# Patient Record
Sex: FEMALE | Race: WHITE | NOT HISPANIC OR LATINO | Employment: PART TIME | URBAN - METROPOLITAN AREA
[De-identification: names, ages, dates, MRNs, and addresses within clinical notes are randomized per-mention and may not be internally consistent; named-entity substitution may affect disease eponyms.]

---

## 2017-02-17 ENCOUNTER — ALLSCRIPTS OFFICE VISIT (OUTPATIENT)
Dept: OTHER | Facility: OTHER | Age: 67
End: 2017-02-17

## 2017-02-25 ENCOUNTER — HOSPITAL ENCOUNTER (EMERGENCY)
Facility: HOSPITAL | Age: 67
Discharge: HOME | End: 2017-02-25
Attending: EMERGENCY MEDICINE
Payer: COMMERCIAL

## 2017-02-25 ENCOUNTER — APPOINTMENT (EMERGENCY)
Dept: CT IMAGING | Facility: HOSPITAL | Age: 67
End: 2017-02-25
Payer: COMMERCIAL

## 2017-02-25 VITALS
HEART RATE: 84 BPM | OXYGEN SATURATION: 94 % | SYSTOLIC BLOOD PRESSURE: 164 MMHG | HEIGHT: 63 IN | RESPIRATION RATE: 20 BRPM | BODY MASS INDEX: 31.71 KG/M2 | WEIGHT: 179 LBS | DIASTOLIC BLOOD PRESSURE: 70 MMHG | TEMPERATURE: 97.2 F

## 2017-02-25 DIAGNOSIS — R31.9 HEMATURIA: Primary | ICD-10-CM

## 2017-02-25 LAB
BACTERIA UR QL AUTO: ABNORMAL /HPF
BILIRUB UR QL STRIP: NEGATIVE
CLARITY UR: CLEAR
COLOR UR: YELLOW
GLUCOSE UR STRIP-MCNC: NEGATIVE MG/DL
HGB UR QL STRIP.AUTO: ABNORMAL
KETONES UR STRIP-MCNC: NEGATIVE MG/DL
LEUKOCYTE ESTERASE UR QL STRIP: NEGATIVE
NITRITE UR QL STRIP: NEGATIVE
NON-SQ EPI CELLS URNS QL MICRO: ABNORMAL /HPF
PH UR STRIP.AUTO: 5.5 [PH] (ref 4.5–8)
PROT UR STRIP-MCNC: NEGATIVE MG/DL
RBC #/AREA URNS AUTO: ABNORMAL /HPF
SP GR UR STRIP.AUTO: 1.02 (ref 1–1.03)
UROBILINOGEN UR QL STRIP.AUTO: 0.2 E.U./DL
WBC #/AREA URNS AUTO: ABNORMAL /HPF

## 2017-02-25 PROCEDURE — 74176 CT ABD & PELVIS W/O CONTRAST: CPT

## 2017-02-25 PROCEDURE — 81001 URINALYSIS AUTO W/SCOPE: CPT | Performed by: EMERGENCY MEDICINE

## 2017-02-25 PROCEDURE — 87086 URINE CULTURE/COLONY COUNT: CPT | Performed by: EMERGENCY MEDICINE

## 2017-02-25 PROCEDURE — 99284 EMERGENCY DEPT VISIT MOD MDM: CPT

## 2017-02-25 RX ORDER — AMOXICILLIN 500 MG/1
500 CAPSULE ORAL 3 TIMES DAILY
Qty: 15 CAPSULE | Refills: 0 | Status: SHIPPED | OUTPATIENT
Start: 2017-02-25 | End: 2017-03-02

## 2017-02-25 RX ORDER — ATORVASTATIN CALCIUM 20 MG/1
TABLET, FILM COATED ORAL
COMMUNITY
Start: 2016-11-09 | End: 2018-05-01 | Stop reason: ALTCHOICE

## 2017-02-25 RX ORDER — ESCITALOPRAM OXALATE 10 MG/1
TABLET ORAL
COMMUNITY
Start: 2016-11-18 | End: 2018-02-14 | Stop reason: SDUPTHER

## 2017-02-26 LAB — BACTERIA UR CULT: NORMAL

## 2017-04-04 ENCOUNTER — ALLSCRIPTS OFFICE VISIT (OUTPATIENT)
Dept: OTHER | Facility: OTHER | Age: 67
End: 2017-04-04

## 2017-04-04 ENCOUNTER — HOSPITAL ENCOUNTER (OUTPATIENT)
Dept: CT IMAGING | Facility: CLINIC | Age: 67
Discharge: HOME | End: 2017-04-04
Payer: COMMERCIAL

## 2017-04-04 ENCOUNTER — TRANSCRIBE ORDERS (OUTPATIENT)
Dept: ADMINISTRATIVE | Facility: HOSPITAL | Age: 67
End: 2017-04-04

## 2017-04-04 DIAGNOSIS — R10.32 ABDOMINAL PAIN, LEFT LOWER QUADRANT: ICD-10-CM

## 2017-04-04 DIAGNOSIS — R31.29 MICROSCOPIC HEMATURIA: ICD-10-CM

## 2017-04-04 DIAGNOSIS — R10.32 LEFT LOWER QUADRANT PAIN: ICD-10-CM

## 2017-04-04 DIAGNOSIS — R31.29 OTHER MICROSCOPIC HEMATURIA: ICD-10-CM

## 2017-04-04 DIAGNOSIS — R10.32 ABDOMINAL PAIN, LEFT LOWER QUADRANT: Primary | ICD-10-CM

## 2017-04-04 PROCEDURE — 74176 CT ABD & PELVIS W/O CONTRAST: CPT

## 2017-04-05 ENCOUNTER — GENERIC CONVERSION - ENCOUNTER (OUTPATIENT)
Dept: OTHER | Facility: OTHER | Age: 67
End: 2017-04-05

## 2017-04-06 ENCOUNTER — APPOINTMENT (OUTPATIENT)
Dept: LAB | Facility: CLINIC | Age: 67
End: 2017-04-06
Payer: COMMERCIAL

## 2017-04-06 DIAGNOSIS — R31.29 OTHER MICROSCOPIC HEMATURIA: ICD-10-CM

## 2017-04-06 DIAGNOSIS — R10.32 LEFT LOWER QUADRANT PAIN: ICD-10-CM

## 2017-04-06 LAB
ALBUMIN SERPL BCP-MCNC: 3.6 G/DL (ref 3.5–5)
ALP SERPL-CCNC: 66 U/L (ref 46–116)
ALT SERPL W P-5'-P-CCNC: 18 U/L (ref 12–78)
ANION GAP SERPL CALCULATED.3IONS-SCNC: 8 MMOL/L (ref 4–13)
AST SERPL W P-5'-P-CCNC: 11 U/L (ref 5–45)
BASOPHILS # BLD AUTO: 0.02 THOUSANDS/ΜL (ref 0–0.1)
BASOPHILS NFR BLD AUTO: 0 % (ref 0–1)
BILIRUB SERPL-MCNC: 0.32 MG/DL (ref 0.2–1)
BILIRUB UR QL STRIP: NEGATIVE
BUN SERPL-MCNC: 13 MG/DL (ref 5–25)
CALCIUM SERPL-MCNC: 9.3 MG/DL (ref 8.3–10.1)
CHLORIDE SERPL-SCNC: 102 MMOL/L (ref 100–108)
CLARITY UR: CLEAR
CO2 SERPL-SCNC: 26 MMOL/L (ref 21–32)
COLOR UR: YELLOW
CREAT SERPL-MCNC: 1.15 MG/DL (ref 0.6–1.3)
EOSINOPHIL # BLD AUTO: 0.14 THOUSAND/ΜL (ref 0–0.61)
EOSINOPHIL NFR BLD AUTO: 2 % (ref 0–6)
ERYTHROCYTE [DISTWIDTH] IN BLOOD BY AUTOMATED COUNT: 13.8 % (ref 11.6–15.1)
GFR SERPL CREATININE-BSD FRML MDRD: 47.5 ML/MIN/1.73SQ M
GLUCOSE SERPL-MCNC: 106 MG/DL (ref 65–140)
GLUCOSE UR STRIP-MCNC: NEGATIVE MG/DL
HCT VFR BLD AUTO: 39.4 % (ref 34.8–46.1)
HGB BLD-MCNC: 13.3 G/DL (ref 11.5–15.4)
HGB UR QL STRIP.AUTO: NEGATIVE
KETONES UR STRIP-MCNC: NEGATIVE MG/DL
LEUKOCYTE ESTERASE UR QL STRIP: NEGATIVE
LYMPHOCYTES # BLD AUTO: 2.38 THOUSANDS/ΜL (ref 0.6–4.47)
LYMPHOCYTES NFR BLD AUTO: 32 % (ref 14–44)
MCH RBC QN AUTO: 31.6 PG (ref 26.8–34.3)
MCHC RBC AUTO-ENTMCNC: 33.8 G/DL (ref 31.4–37.4)
MCV RBC AUTO: 94 FL (ref 82–98)
MONOCYTES # BLD AUTO: 0.56 THOUSAND/ΜL (ref 0.17–1.22)
MONOCYTES NFR BLD AUTO: 7 % (ref 4–12)
NEUTROPHILS # BLD AUTO: 4.44 THOUSANDS/ΜL (ref 1.85–7.62)
NEUTS SEG NFR BLD AUTO: 59 % (ref 43–75)
NITRITE UR QL STRIP: NEGATIVE
NRBC BLD AUTO-RTO: 0 /100 WBCS
PH UR STRIP.AUTO: 6.5 [PH] (ref 4.5–8)
PLATELET # BLD AUTO: 336 THOUSANDS/UL (ref 149–390)
PMV BLD AUTO: 10.2 FL (ref 8.9–12.7)
POTASSIUM SERPL-SCNC: 4.3 MMOL/L (ref 3.5–5.3)
PROT SERPL-MCNC: 7.4 G/DL (ref 6.4–8.2)
PROT UR STRIP-MCNC: NEGATIVE MG/DL
RBC # BLD AUTO: 4.21 MILLION/UL (ref 3.81–5.12)
SODIUM SERPL-SCNC: 136 MMOL/L (ref 136–145)
SP GR UR STRIP.AUTO: 1.01 (ref 1–1.03)
UROBILINOGEN UR QL STRIP.AUTO: 0.2 E.U./DL
WBC # BLD AUTO: 7.55 THOUSAND/UL (ref 4.31–10.16)

## 2017-04-06 PROCEDURE — 81003 URINALYSIS AUTO W/O SCOPE: CPT

## 2017-04-06 PROCEDURE — 80053 COMPREHEN METABOLIC PANEL: CPT

## 2017-04-06 PROCEDURE — 88112 CYTOPATH CELL ENHANCE TECH: CPT

## 2017-04-06 PROCEDURE — 85025 COMPLETE CBC W/AUTO DIFF WBC: CPT

## 2017-04-06 PROCEDURE — 36415 COLL VENOUS BLD VENIPUNCTURE: CPT

## 2017-04-10 ENCOUNTER — GENERIC CONVERSION - ENCOUNTER (OUTPATIENT)
Dept: OTHER | Facility: OTHER | Age: 67
End: 2017-04-10

## 2017-11-19 ENCOUNTER — APPOINTMENT (OUTPATIENT)
Dept: URGENT CARE | Facility: CLINIC | Age: 67
End: 2017-11-19
Payer: COMMERCIAL

## 2017-11-19 ENCOUNTER — TRANSCRIBE ORDERS (OUTPATIENT)
Dept: URGENT CARE | Facility: CLINIC | Age: 67
End: 2017-11-19

## 2017-11-19 DIAGNOSIS — R69 SICKNESS: Primary | ICD-10-CM

## 2017-11-19 PROCEDURE — S9083 URGENT CARE CENTER GLOBAL: HCPCS

## 2017-11-19 PROCEDURE — G0382 LEV 3 HOSP TYPE B ED VISIT: HCPCS

## 2017-11-19 PROCEDURE — 87798 DETECT AGENT NOS DNA AMP: CPT

## 2017-11-20 LAB
FLUAV AG SPEC QL: NORMAL
FLUBV AG SPEC QL: NORMAL
RSV B RNA SPEC QL NAA+PROBE: NORMAL

## 2018-01-11 NOTE — MISCELLANEOUS
Message   Recorded as Task   Date: 11/18/2016 06:36 AM, Created By: Annika Cardenas   Task Name: Go to Result   Assigned To: Zignals   Regarding Patient: Kian Mascorro, Status: Active   Comment:    Latosha Tony - 18 Nov 2016 6:36 AM     TASK CREATED  Please let the patient know the Holter monitor was normal    11/18/2016 Patient notified at office visit  trb      Active Problems    1  Allergic rhinitis (477 9) (J30 9)   2  Cervical myofascial strain, initial encounter (847 0) (S16 1XXA)   3  Chronic cervical pain (723 1,338 29) (M54 2,G89 29)   4  Chronic nausea (787 02) (R11 0)   5  Chronic obstructive pulmonary disease (496) (J44 9)   6  Depression (311) (F32 9)   7  Esophageal reflux (530 81) (K21 9)   8  Exposure To Molds (V87 31)   9  Generalized anxiety disorder (300 02) (F41 1)   10  Hyperlipidemia (272 4) (E78 5)   11  Left facial numbness (782 0) (R20 0)   12  Lightheadedness (780 4) (R42)   13  Near syncope (780 2) (R55)   14  Nicotine dependence (305 1) (F17 200)   15  Panic attack (300 01) (F41 0)    Current Meds   1  Atorvastatin Calcium 20 MG Oral Tablet; TAKE 1 TABLET AT BEDTIME; Therapy: [id number] to (Tae Ribeiro)  Requested for: [id number]; Last   Rx:09Nov2016 Ordered   2  Diclofenac Sodium 1 % Transdermal Gel; 2 GM OF GEL TO AFFECTED AREA 4 TIMES   DAILY as needed  DO NOT APPLY MORE THAN 8 GM DAILY TO ANY ONE AFFECTED   JOINT; Therapy: [id number] to (Chris Lara)  Requested for: 26Oct2016 Ordered   3  Escitalopram Oxalate 10 MG Oral Tablet; TAKE 1 AND 1/2 TABLETS DAILY; Therapy: 69KFA4051 to (Evaluate:[id number]); Last Rx:18Nov2016 Ordered   4  LORazepam 0 5 MG Oral Tablet; TAKE 1 TABLET 3 times daily PRN; Therapy: [id number] to (Evaluate:28Nov2016); Last Rx:18Nov2016 Ordered   5  PriLOSEC OTC 20 MG Oral Tablet Delayed Release; Take 1 tablet daily Recorded    Allergies    1  Ceftin TABS   2  Shellfish-derived Products   3  Bactrim TABS   4  Erythromycin Derivatives   5  LevoFLOXacin TABS   6  Silver Nitrate MISC    Signatures   Electronically signed by :  Carlota Rhodes, ; Nov 18 2016  3:49PM EST                       (Author)

## 2018-01-14 VITALS
TEMPERATURE: 97 F | SYSTOLIC BLOOD PRESSURE: 130 MMHG | DIASTOLIC BLOOD PRESSURE: 70 MMHG | WEIGHT: 180 LBS | BODY MASS INDEX: 31.89 KG/M2 | HEIGHT: 63 IN | RESPIRATION RATE: 15 BRPM | HEART RATE: 80 BPM

## 2018-01-15 VITALS
DIASTOLIC BLOOD PRESSURE: 74 MMHG | HEIGHT: 63 IN | HEART RATE: 78 BPM | BODY MASS INDEX: 31.54 KG/M2 | SYSTOLIC BLOOD PRESSURE: 150 MMHG | TEMPERATURE: 98.2 F | WEIGHT: 178 LBS

## 2018-01-18 NOTE — MISCELLANEOUS
Message   Recorded as Task   Date: 11/09/2016 05:54 PM, Created By: Malinda Cuenca   Task Name: Go to Result   Assigned To: Dello Aryan   Regarding Patient: Lynn Mcintosh, Status: Active   Comment:    Malinda Cuenca - 09 Nov 2016 5:54 PM     TASK CREATED  Please let the patient know the Echo was normal    Fabio Baeza - 10 Nov 2016 8:55 AM     TASK EDITED   Patient was informed by her home number  Active Problems    1  Allergic rhinitis (477 9) (J30 9)   2  Cervical myofascial strain, initial encounter (847 0) (S16 1XXA)   3  Chronic cervical pain (723 1,338 29) (M54 2,G89 29)   4  Chronic nausea (787 02) (R11 0)   5  Chronic obstructive pulmonary disease (496) (J44 9)   6  Depression (311) (F32 9)   7  Esophageal reflux (530 81) (K21 9)   8  Exposure To Molds (V87 31)   9  Hyperlipidemia (272 4) (E78 5)   10  Left facial numbness (782 0) (R20 0)   11  Lightheadedness (780 4) (R42)   12  Near syncope (780 2) (R55)   13  Needs flu shot (V04 81) (Z23)   14  Nicotine dependence (305 1) (F17 200)    Current Meds   1  Atorvastatin Calcium 20 MG Oral Tablet; TAKE 1 TABLET AT BEDTIME; Therapy: [id number] to (Roberto Brenner)  Requested for: [id number]; Last   Rx:09Nov2016 Ordered   2  Diclofenac Sodium 1 % Transdermal Gel; 2 GM OF GEL TO AFFECTED AREA 4 TIMES   DAILY as needed  DO NOT APPLY MORE THAN 8 GM DAILY TO ANY ONE AFFECTED   JOINT; Therapy: [id number] to (Last Dustin Eagle)  Requested for: 26Oct2016 Ordered   3  PriLOSEC OTC 20 MG Oral Tablet Delayed Release; Take 1 tablet daily Recorded    Allergies    1  Ceftin TABS   2  Shellfish-derived Products   3  Bactrim TABS   4  Erythromycin Derivatives   5  LevoFLOXacin TABS   6   Silver Nitrate MISC    Signatures   Electronically signed by : Cleo Schmidt, ; Nov 10 2016  8:55AM EST                       (Author)

## 2018-02-14 DIAGNOSIS — F32.A DEPRESSION, UNSPECIFIED DEPRESSION TYPE: Primary | ICD-10-CM

## 2018-02-14 RX ORDER — ESCITALOPRAM OXALATE 10 MG/1
TABLET ORAL
Qty: 135 TABLET | Refills: 1 | Status: SHIPPED | OUTPATIENT
Start: 2018-02-14 | End: 2018-08-17 | Stop reason: SDUPTHER

## 2018-05-01 ENCOUNTER — OFFICE VISIT (OUTPATIENT)
Dept: FAMILY MEDICINE CLINIC | Facility: CLINIC | Age: 68
End: 2018-05-01
Payer: COMMERCIAL

## 2018-05-01 VITALS
HEIGHT: 63 IN | SYSTOLIC BLOOD PRESSURE: 114 MMHG | TEMPERATURE: 98.7 F | BODY MASS INDEX: 28.7 KG/M2 | DIASTOLIC BLOOD PRESSURE: 60 MMHG | HEART RATE: 72 BPM | WEIGHT: 162 LBS

## 2018-05-01 DIAGNOSIS — A08.4 VIRAL GASTROENTERITIS: Primary | ICD-10-CM

## 2018-05-01 PROCEDURE — 3008F BODY MASS INDEX DOCD: CPT | Performed by: FAMILY MEDICINE

## 2018-05-01 PROCEDURE — 99213 OFFICE O/P EST LOW 20 MIN: CPT | Performed by: FAMILY MEDICINE

## 2018-05-01 RX ORDER — OMEPRAZOLE 20 MG/1
1 TABLET, DELAYED RELEASE ORAL DAILY PRN
COMMUNITY

## 2018-05-01 RX ORDER — ONDANSETRON 4 MG/1
4 TABLET, ORALLY DISINTEGRATING ORAL EVERY 6 HOURS PRN
Qty: 20 TABLET | Refills: 0 | Status: SHIPPED | OUTPATIENT
Start: 2018-05-01 | End: 2018-10-26

## 2018-05-01 NOTE — PROGRESS NOTES
Assessment/Plan:  Viral gastroenteritis-the patient was advised that this is viral and will need to run its course  I have given her the generic Zofran to take as needed for nausea  She will follow a brat diet and will avoid dairy products for the rest of the week  She will take small frequent sips of clear liquids and gradually advance her diet  She can take Tylenol 325 med mg 1-2 tablets every 6 hours as needed for pain or fever  She will follow up in the emergency room with any worsening pain, vomiting, bloody diarrhea, or fever  We will see her back as needed  Diagnoses and all orders for this visit:    Viral gastroenteritis  -     ondansetron (ZOFRAN-ODT) 4 mg disintegrating tablet; Take 1 tablet (4 mg total) by mouth every 6 (six) hours as needed for nausea or vomiting    Other orders  -     omeprazole (PRILOSEC OTC) 20 MG tablet; Take 1 tablet by mouth daily as needed         No Follow-up on file  Subjective:   Chief Complaint   Patient presents with    Diarrhea     x several days    Vomiting        Patient ID: Earnestine Vazquez is a 74 y o  female presents today for vomiting and diarrhea  Earnestine Vazquez is a 74 y o  female who presents today with diarrhea and vomiting for 3 days  The diarrhea is almost constant is all watery  She is unable to keep anything down including water  She is achy and cannot eat  She is still urinating  There are palpitations  There is no chest pain or shortness of breath  There is abdominal cramping  There is back pain  She tried Aleve for the pain but it made her nauseated and she cannot keep it down  There are no fevers  There is a productive cough  There is a headache  She cannot leave the house  She is feeling very sick  Diarrhea    This is a new problem  The current episode started in the past 7 days  The problem occurs more than 10 times per day  The stool consistency is described as watery   Associated symptoms include abdominal pain, arthralgias, chills, coughing, headaches, myalgias, sweats and vomiting  Pertinent negatives include no bloating, fever, increased  flatus, URI or weight loss  The symptoms are aggravated by dairy products  She has tried analgesics for the symptoms  The treatment provided no relief  Vomiting    This is a new problem  The current episode started in the past 7 days  The problem occurs 2 to 4 times per day  The problem has been unchanged  Associated symptoms include abdominal pain, arthralgias, chills, coughing, diarrhea, headaches, myalgias and sweats  Pertinent negatives include no chest pain, dizziness, fever, URI or weight loss  She has tried acetaminophen for the symptoms  The treatment provided no relief  The following portions of the patient's history were reviewed and updated as appropriate: allergies, current medications, past family history, past medical history, past social history, past surgical history and problem list   There is no problem list on file for this patient  Past Medical History:   Diagnosis Date    Hyperlipidemia     Hypertension      Past Surgical History:   Procedure Laterality Date     SECTION       Allergies   Allergen Reactions    Cefuroxime Hives    Erythromycin Shortness Of Breath    Shellfish-Derived Products Anaphylaxis    Levofloxacin GI Intolerance    Silver Sulfadiazine     Sulfamethoxazole-Trimethoprim Hives     No family history on file  Social History     Social History    Marital status: /Civil Union     Spouse name: N/A    Number of children: N/A    Years of education: N/A     Occupational History    Not on file       Social History Main Topics    Smoking status: Current Every Day Smoker     Packs/day: 0 50    Smokeless tobacco: Never Used    Alcohol use No    Drug use: No    Sexual activity: Not on file     Other Topics Concern    Not on file     Social History Narrative    No narrative on file     Current Outpatient Prescriptions on File Prior to Visit   Medication Sig Dispense Refill    escitalopram (LEXAPRO) 10 mg tablet Take 1 and 1/2 tablets daily 135 tablet 1    [DISCONTINUED] atorvastatin (LIPITOR) 20 mg tablet Take by mouth       No current facility-administered medications on file prior to visit  Review of Systems   Constitutional: Positive for chills  Negative for fever and weight loss  Respiratory: Positive for cough  Cardiovascular: Negative for chest pain  Gastrointestinal: Positive for abdominal pain, diarrhea and vomiting  Negative for bloating and flatus  Musculoskeletal: Positive for arthralgias and myalgias  Neurological: Positive for headaches  Negative for dizziness  Objective:  Vitals:    05/01/18 1021   BP: 114/60   BP Location: Right arm   Patient Position: Sitting   Cuff Size: Standard   Pulse: 72   Temp: 98 7 °F (37 1 °C)   TempSrc: Tympanic   Weight: 73 5 kg (162 lb)   Height: 5' 3" (1 6 m)     Body mass index is 28 7 kg/m²  Wt Readings from Last 3 Encounters:   05/01/18 73 5 kg (162 lb)   04/04/17 80 7 kg (178 lb)   02/25/17 81 2 kg (179 lb)     Temp Readings from Last 3 Encounters:   05/01/18 98 7 °F (37 1 °C) (Tympanic)   04/04/17 98 2 °F (36 8 °C)   02/25/17 (!) 97 2 °F (36 2 °C)     BP Readings from Last 3 Encounters:   05/01/18 114/60   04/04/17 150/74   02/25/17 164/70     Pulse Readings from Last 3 Encounters:   05/01/18 72   04/04/17 78   02/25/17 84        Physical Exam   Constitutional: She is oriented to person, place, and time  She appears well-developed and well-nourished  She appears distressed  HENT:   Head: Normocephalic and atraumatic  Mouth/Throat: No oropharyngeal exudate  Eyes: Conjunctivae and EOM are normal  Pupils are equal, round, and reactive to light  Neck: Normal range of motion  No JVD present  No tracheal deviation present  No thyromegaly present     Cardiovascular: Normal rate, regular rhythm, normal heart sounds and intact distal pulses  Exam reveals no gallop and no friction rub  No murmur heard  Pulmonary/Chest: Effort normal and breath sounds normal  No stridor  No respiratory distress  She has no wheezes  She has no rales  She exhibits no tenderness  Abdominal: Soft  Bowel sounds are normal  She exhibits no distension and no mass  There is tenderness  There is no rebound and no guarding  Musculoskeletal: Normal range of motion  She exhibits no edema, tenderness or deformity  Lymphadenopathy:     She has no cervical adenopathy  Neurological: She is alert and oriented to person, place, and time  She has normal reflexes  No cranial nerve deficit  She exhibits normal muscle tone  Coordination normal    Skin: Skin is warm and dry

## 2018-05-01 NOTE — PATIENT INSTRUCTIONS
Gastroenteritis   AMBULATORY CARE:   Gastroenteritis , or stomach flu, is an infection of the stomach and intestines  It is caused by bacteria, parasites, or viruses  Common symptoms include the following:   · Diarrhea or gas    · Nausea, vomiting, or poor appetite    · Abdominal cramps, pain, or gurgling    · Fever    · Tiredness or weakness    · Headaches or muscle aches with any of the above symptoms  Call 911 for any of the following:   · You have trouble breathing or a very fast pulse  Seek care immediately if:   · You see blood in your diarrhea  · You cannot stop vomiting  · You have not urinated for 12 hours  · You feel like you are going to faint  Contact your healthcare provider if:   · You have a fever  · You continue to vomit or have diarrhea, even after treatment  · You see worms in your diarrhea  · Your mouth or eyes are dry  You are not urinating as much or as often  · You have questions or concerns about your condition or care  Treatment for gastroenteritis  may include medicines to slow or stop your diarrhea or vomiting  You may also need medicines to treat an infection caused by bacteria or a parasite  Manage your symptoms:   · Drink liquids as directed  Ask your healthcare provider how much liquid to drink each day, and which liquids are best for you  You may also need to drink an oral rehydration solution (ORS)  An ORS has the right amounts of sugar, salt, and minerals in water to replace body fluids  · Eat bland foods  When you feel hungry, begin eating soft, bland foods  Examples are bananas, clear soup, potatoes, and applesauce  Do not have dairy products, alcohol, sugary drinks, or drinks with caffeine until you feel better  · Rest as much as possible  Slowly start to do more each day when you begin to feel better  Prevent the spread of germs:  Gastroenteritis can spread easily   Keep yourself, your family, and your surroundings clean to help prevent the spread of gastroenteritis:  · Wash your hands often  Use soap and water  Wash your hands after you use the bathroom, change a child's diapers, or sneeze  Wash your hands before you prepare or eat food  · Clean surfaces and do laundry often  Wash your clothes and towels separately from the rest of the laundry  Clean surfaces in your home with antibacterial  or bleach  · Clean food thoroughly and cook safely  Wash raw vegetables before you cook  Cook meat, fish, and eggs fully  Do not use the same dishes for raw meat as you do for other foods  Refrigerate any leftover food immediately  · Be aware when you camp or travel  Drink only clean water  Do not drink from rivers or lakes unless you purify or boil the water first  When you travel, drink bottled water and do not add ice  Do not eat fruit that has not been peeled  Do not eat raw fish or meat that is not fully cooked  Follow up with your healthcare provider as directed:  Write down your questions so you remember to ask them during your visits  © 2017 [facility] Information is for End User's use only and may not be sold, redistributed or otherwise used for commercial purposes  All illustrations and images included in CareNotes® are the copyrighted property of A D [employer] M [employer]  or [employer] Capone  The above information is an  only  It is not intended as medical advice for individual conditions or treatments  Talk to your doctor, nurse or pharmacist before following any medical regimen to see if it is safe and effective for you  TRY TYLENOL 325MG 2 EVERY 6 HOURS AS NEEDED FOR PAIN AND FEVER

## 2018-05-01 NOTE — LETTER
May 1, 2018     Patient: Barbaraann Seip   YOB: 1950   Date of Visit: 5/1/2018       To Whom it May Concern:    Floresita Jimenez is under my professional care  She was seen in my office on 5/1/2018  She may return to work on 5/4/2018  PLEASE EXCUSE JO FROM WORK FROM 4/29/2018 UNTIL 5/4/2018 FOR A MEDICAL ILLNESS  If you have any questions or concerns, please don't hesitate to call           Sincerely,          June Dance, DO        CC: No Recipients

## 2018-05-02 ENCOUNTER — APPOINTMENT (OUTPATIENT)
Dept: LAB | Facility: CLINIC | Age: 68
End: 2018-05-02
Payer: COMMERCIAL

## 2018-05-02 DIAGNOSIS — R19.7 DIARRHEA, UNSPECIFIED TYPE: Primary | ICD-10-CM

## 2018-05-02 DIAGNOSIS — R19.7 DIARRHEA, UNSPECIFIED TYPE: ICD-10-CM

## 2018-05-02 PROCEDURE — 87493 C DIFF AMPLIFIED PROBE: CPT

## 2018-05-03 ENCOUNTER — APPOINTMENT (OUTPATIENT)
Dept: LAB | Facility: CLINIC | Age: 68
End: 2018-05-03
Payer: COMMERCIAL

## 2018-05-03 DIAGNOSIS — R19.7 DIARRHEA, UNSPECIFIED TYPE: ICD-10-CM

## 2018-05-03 LAB — C DIFF TOX GENS STL QL NAA+PROBE: NORMAL

## 2018-05-03 PROCEDURE — 87177 OVA AND PARASITES SMEARS: CPT

## 2018-05-03 PROCEDURE — 87209 SMEAR COMPLEX STAIN: CPT

## 2018-05-03 PROCEDURE — 87505 NFCT AGENT DETECTION GI: CPT

## 2018-05-03 PROCEDURE — 87205 SMEAR GRAM STAIN: CPT

## 2018-05-04 LAB
CAMPYLOBACTER DNA SPEC NAA+PROBE: NORMAL
SALMONELLA DNA SPEC QL NAA+PROBE: NORMAL
SHIGA TOXIN STX GENE SPEC NAA+PROBE: NORMAL
SHIGELLA DNA SPEC QL NAA+PROBE: NORMAL

## 2018-05-07 LAB
O+P STL CONC: NORMAL
WBC STL QL MICRO: NORMAL

## 2018-08-17 DIAGNOSIS — F32.A DEPRESSION, UNSPECIFIED DEPRESSION TYPE: ICD-10-CM

## 2018-08-17 RX ORDER — ESCITALOPRAM OXALATE 10 MG/1
TABLET ORAL
Qty: 135 TABLET | Refills: 1 | Status: SHIPPED | OUTPATIENT
Start: 2018-08-17 | End: 2019-01-23 | Stop reason: SDUPTHER

## 2018-10-26 ENCOUNTER — OFFICE VISIT (OUTPATIENT)
Dept: FAMILY MEDICINE CLINIC | Facility: CLINIC | Age: 68
End: 2018-10-26
Payer: COMMERCIAL

## 2018-10-26 VITALS
BODY MASS INDEX: 29.23 KG/M2 | WEIGHT: 165 LBS | SYSTOLIC BLOOD PRESSURE: 148 MMHG | DIASTOLIC BLOOD PRESSURE: 78 MMHG | TEMPERATURE: 97.8 F | HEIGHT: 63 IN | RESPIRATION RATE: 15 BRPM | OXYGEN SATURATION: 97 % | HEART RATE: 78 BPM

## 2018-10-26 DIAGNOSIS — H66.001 ACUTE SUPPURATIVE OTITIS MEDIA OF RIGHT EAR WITHOUT SPONTANEOUS RUPTURE OF TYMPANIC MEMBRANE, RECURRENCE NOT SPECIFIED: Primary | ICD-10-CM

## 2018-10-26 DIAGNOSIS — M25.511 ACUTE PAIN OF RIGHT SHOULDER: ICD-10-CM

## 2018-10-26 DIAGNOSIS — R06.2 WHEEZING: ICD-10-CM

## 2018-10-26 DIAGNOSIS — J30.1 SEASONAL ALLERGIC RHINITIS DUE TO POLLEN: ICD-10-CM

## 2018-10-26 PROCEDURE — 3008F BODY MASS INDEX DOCD: CPT | Performed by: NURSE PRACTITIONER

## 2018-10-26 PROCEDURE — 99214 OFFICE O/P EST MOD 30 MIN: CPT | Performed by: NURSE PRACTITIONER

## 2018-10-26 PROCEDURE — 1160F RVW MEDS BY RX/DR IN RCRD: CPT | Performed by: NURSE PRACTITIONER

## 2018-10-26 RX ORDER — CETIRIZINE HYDROCHLORIDE 10 MG/1
10 TABLET ORAL DAILY
Qty: 30 TABLET | Refills: 2 | Status: SHIPPED | OUTPATIENT
Start: 2018-10-26

## 2018-10-26 RX ORDER — METHYLPREDNISOLONE 4 MG/1
TABLET ORAL
Qty: 21 TABLET | Refills: 0 | Status: SHIPPED | OUTPATIENT
Start: 2018-10-26 | End: 2019-01-23 | Stop reason: ALTCHOICE

## 2018-10-26 RX ORDER — ALBUTEROL SULFATE 90 UG/1
2 AEROSOL, METERED RESPIRATORY (INHALATION) EVERY 6 HOURS PRN
Qty: 1 INHALER | Refills: 0 | Status: SHIPPED | OUTPATIENT
Start: 2018-10-26 | End: 2018-11-25

## 2018-10-26 RX ORDER — DOXYCYCLINE 100 MG/1
100 TABLET ORAL 2 TIMES DAILY
Qty: 20 TABLET | Refills: 0 | Status: SHIPPED | OUTPATIENT
Start: 2018-10-26 | End: 2018-11-05

## 2018-10-26 NOTE — PATIENT INSTRUCTIONS
Otitis Media   WHAT YOU NEED TO KNOW:   Otitis media is an ear infection  DISCHARGE INSTRUCTIONS:   Medicines:  · Ibuprofen or acetaminophen  helps decrease your pain and fever  They are available without a doctor's order  Ask your healthcare provider which medicine is right for you  Ask how much to take and how often to take it  These medicines can cause stomach bleeding if not taken correctly  Ibuprofen can cause kidney damage  Do not take ibuprofen if you have kidney disease, an ulcer, or allergies to aspirin  Acetaminophen can cause liver damage  Do not drink alcohol if you take acetaminophen  · Ear drops  help treat your ear pain  · Antibiotics  help treat a bacterial infection that caused your ear infection  · Take your medicine as directed  Contact your healthcare provider if you think your medicine is not helping or if you have side effects  Tell him or her if you are allergic to any medicine  Keep a list of the medicines, vitamins, and herbs you take  Include the amounts, and when and why you take them  Bring the list or the pill bottles to follow-up visits  Carry your medicine list with you in case of an emergency  Heat or ice:   · Heat  may be used to decrease your pain  Place a warm, moist washcloth on your ear  Apply for 15 to 20 minutes, 3 to 4 times a day    · Ice  helps decrease swelling and pain  Use an ice pack or put crushed ice in a plastic bag  Cover the ice pack with a towel and place it on your ear for 15 to 20 minutes, 3 to 4 times a day for 2 days  Prevent otitis media:   · Wash your hands often  Use soap and water  Wash your hands after you use the bathroom, change a child's diapers, or sneeze  Wash your hands before you prepare or eat food  · Stay away from people who are ill  Some germs are easily and quickly spread through contact  Return to work or school: You may return to work or school when your fever is gone     Follow up with your healthcare provider as directed:  Write down your questions so you remember to ask them during your visits  Contact your healthcare provider if:   · Your ear pain gets worse or does not go away, even after treatment  · The outside of your ear is red or swollen  · You have vomiting or diarrhea  · You have fluid coming from your ear  · You have questions or concerns about your condition or care  Return to the emergency department if:   · You have a seizure  · You have a fever and a stiff neck  © 2017 [facility] Information is for End User's use only and may not be sold, redistributed or otherwise used for commercial purposes  All illustrations and images included in CareNotes® are the copyrighted property of [employer] M [employer]  or [employer]adi Capone  The above information is an  only  It is not intended as medical advice for individual conditions or treatments  Talk to your doctor, nurse or pharmacist before following any medical regimen to see if it is safe and effective for you

## 2018-10-26 NOTE — LETTER
October 26, 2018     Patient: Trae Rosen   YOB: 1950   Date of Visit: 10/26/2018       To Whom it May Concern:    Kulwinder Wayne is under my professional care  She was seen in my office on 10/26/2018  She may return to work on 10/29/2018  If you have any questions or concerns, please don't hesitate to call           Sincerely,          DEBBIE Garrison        CC: No Recipients

## 2018-10-26 NOTE — PROGRESS NOTES
Assessment/Plan   Diagnoses and all orders for this visit:    Acute suppurative otitis media of right ear without spontaneous rupture of tympanic membrane, recurrence not specified  -     doxycycline (ADOXA) 100 MG tablet; Take 1 tablet (100 mg total) by mouth 2 (two) times a day for 10 days    Wheezing  -     albuterol (PROVENTIL HFA,VENTOLIN HFA) 90 mcg/act inhaler; Inhale 2 puffs every 6 (six) hours as needed for wheezing for up to 30 days    Acute pain of right shoulder  -     Ambulatory referral to Physical Therapy; Future  -     Methylprednisolone 4 MG TBPK; Use as directed on package    Seasonal allergic rhinitis due to pollen  -     cetirizine (ZyrTEC) 10 mg tablet; Take 1 tablet (10 mg total) by mouth daily        Chief Complaint   Patient presents with    Ear Fullness     left ear fullness    Earache     right ear pain for past 1 week   Shoulder Pain     right shoulder pain       Subjective   Patient ID: Dea Banda is a 79 y o  female  Vitals:    10/26/18 1325   BP: 148/78   Pulse: 78   Resp: 15   Temp: 97 8 °F (36 6 °C)   SpO2: 97%     Ear Fullness    There is pain in the right ear  This is a new problem  The current episode started in the past 7 days  The problem occurs constantly  The problem has been gradually worsening  The maximum temperature recorded prior to her arrival was 100 4 - 100 9 F  The fever has been present for 1 to 2 days  The pain is at a severity of 7/10  The pain is moderate  Associated symptoms include rhinorrhea  Pertinent negatives include no coughing, diarrhea, ear discharge or neck pain  She has tried acetaminophen for the symptoms  The treatment provided no relief  Shoulder Pain    The pain is present in the right shoulder  This is a recurrent problem  The current episode started in the past 7 days  There has been no history of extremity trauma (repetitive movement at employment)  The problem occurs constantly  The problem has been unchanged   The pain is at a severity of 5/10  The pain is moderate  Associated symptoms include a fever  Pertinent negatives include no inability to bear weight, itching, joint locking, joint swelling, numbness, stiffness or tingling  The symptoms are aggravated by activity  She has tried nothing for the symptoms  The treatment provided no relief  Family history does not include gout or rheumatoid arthritis  There is no history of diabetes, gout, osteoarthritis or rheumatoid arthritis  The following portions of the patient's history were reviewed and updated as appropriate: allergies, current medications, past family history, past social history, past surgical history and problem list     Review of Systems   Constitutional: Positive for chills, fatigue and fever  HENT: Positive for congestion, ear pain and rhinorrhea  Negative for ear discharge, tinnitus and trouble swallowing  Eyes: Negative  Negative for photophobia, redness and visual disturbance  Respiratory: Positive for wheezing  Negative for cough and shortness of breath  Cardiovascular: Negative  Negative for chest pain, palpitations and leg swelling  Gastrointestinal: Negative  Negative for diarrhea and nausea  Endocrine: Negative  Genitourinary: Negative  Musculoskeletal: Positive for arthralgias (right shoulder)  Negative for gout, neck pain, neck stiffness and stiffness  Skin: Negative  Negative for itching  Allergic/Immunologic: Negative  Neurological: Negative  Negative for tingling and numbness  Hematological: Negative  Psychiatric/Behavioral: Negative  Objective     Physical Exam   Constitutional: She is oriented to person, place, and time  She appears well-developed and well-nourished  No distress  HENT:   Head: Normocephalic and atraumatic  Right Ear: No tenderness  Tympanic membrane is erythematous and bulging  Tympanic membrane is not perforated   A middle ear effusion (Cloudy white behind tympanic membrane unable to visualize cone of light edges of tympanic membrane red) is present  No decreased hearing is noted  Left Ear: No tenderness  Tympanic membrane is not erythematous and not bulging  A middle ear effusion is present  No decreased hearing is noted  Nose: Mucosal edema (red swollen nasal turbinates), rhinorrhea and sinus tenderness present  Right sinus exhibits maxillary sinus tenderness  Left sinus exhibits maxillary sinus tenderness  Mouth/Throat: Uvula is midline, oropharynx is clear and moist and mucous membranes are normal  Normal dentition  No oropharyngeal exudate, posterior oropharyngeal edema, posterior oropharyngeal erythema or tonsillar abscesses  Tonsils are 1+ on the right  Tonsils are 1+ on the left  No tonsillar exudate  Eyes: Pupils are equal, round, and reactive to light  Conjunctivae are normal  Right eye exhibits no discharge  Left eye exhibits no discharge  Neck: Normal range of motion  Neck supple  No thyromegaly present  Cardiovascular: Normal rate, regular rhythm, normal heart sounds and intact distal pulses  No murmur heard  Pulmonary/Chest: Effort normal  She has wheezes  Abdominal: Soft  Bowel sounds are normal  She exhibits no distension  Musculoskeletal: She exhibits tenderness (right shoulder)  She exhibits no edema or deformity  Decreased ROM of right shoulder, reduced abduction, no change in strength   Lymphadenopathy:     She has cervical adenopathy (right anterior cervical chain)  Neurological: She is alert and oriented to person, place, and time  No cranial nerve deficit  Skin: Skin is warm and dry  Capillary refill takes less than 2 seconds  No rash noted  She is not diaphoretic  No erythema  Psychiatric: She has a normal mood and affect  Her behavior is normal  Judgment and thought content normal    Nursing note and vitals reviewed      Allergies   Allergen Reactions    Cefuroxime Hives    Erythromycin Shortness Of Breath    Shellfish-Derived Products Anaphylaxis    Levofloxacin GI Intolerance and Abdominal Pain     Reaction Date: 26Feb2013;     Silver Sulfadiazine     Sulfamethoxazole-Trimethoprim Hives     Patient Active Problem List   Diagnosis    Allergic rhinitis    Chronic cervical pain    Chronic obstructive pulmonary disease (HCC)    Depression    Esophageal reflux    Generalized anxiety disorder    Hyperlipidemia    Nicotine dependence       Current Outpatient Prescriptions:     escitalopram (LEXAPRO) 10 mg tablet, Take 1 and 1/2 tablets daily, Disp: 135 tablet, Rfl: 1    omeprazole (PRILOSEC OTC) 20 MG tablet, Take 1 tablet by mouth daily as needed  , Disp: , Rfl:     albuterol (PROVENTIL HFA,VENTOLIN HFA) 90 mcg/act inhaler, Inhale 2 puffs every 6 (six) hours as needed for wheezing for up to 30 days, Disp: 1 Inhaler, Rfl: 0    cetirizine (ZyrTEC) 10 mg tablet, Take 1 tablet (10 mg total) by mouth daily, Disp: 30 tablet, Rfl: 2    doxycycline (ADOXA) 100 MG tablet, Take 1 tablet (100 mg total) by mouth 2 (two) times a day for 10 days, Disp: 20 tablet, Rfl: 0    Methylprednisolone 4 MG TBPK, Use as directed on package, Disp: 21 tablet, Rfl: 0  Social History     Social History    Marital status: /Civil Union     Spouse name: N/A    Number of children: N/A    Years of education: N/A     Occupational History    Not on file       Social History Main Topics    Smoking status: Current Every Day Smoker     Packs/day: 0 50    Smokeless tobacco: Never Used    Alcohol use No    Drug use: No    Sexual activity: Not on file     Other Topics Concern    Not on file     Social History Narrative    No narrative on file     Family History   Problem Relation Age of Onset    Stroke Mother         CVA    Diabetes type II Mother     No Known Problems Father

## 2019-01-23 ENCOUNTER — TELEPHONE (OUTPATIENT)
Dept: FAMILY MEDICINE CLINIC | Facility: CLINIC | Age: 69
End: 2019-01-23

## 2019-01-23 ENCOUNTER — OFFICE VISIT (OUTPATIENT)
Dept: FAMILY MEDICINE CLINIC | Facility: CLINIC | Age: 69
End: 2019-01-23
Payer: COMMERCIAL

## 2019-01-23 VITALS
HEIGHT: 63 IN | WEIGHT: 163.4 LBS | DIASTOLIC BLOOD PRESSURE: 70 MMHG | SYSTOLIC BLOOD PRESSURE: 130 MMHG | HEART RATE: 84 BPM | BODY MASS INDEX: 28.95 KG/M2 | TEMPERATURE: 98.3 F

## 2019-01-23 DIAGNOSIS — J01.40 ACUTE NON-RECURRENT PANSINUSITIS: Primary | ICD-10-CM

## 2019-01-23 DIAGNOSIS — J30.9 ALLERGIC RHINITIS, UNSPECIFIED SEASONALITY, UNSPECIFIED TRIGGER: ICD-10-CM

## 2019-01-23 DIAGNOSIS — F32.A DEPRESSION, UNSPECIFIED DEPRESSION TYPE: ICD-10-CM

## 2019-01-23 PROCEDURE — 99214 OFFICE O/P EST MOD 30 MIN: CPT | Performed by: FAMILY MEDICINE

## 2019-01-23 RX ORDER — ESCITALOPRAM OXALATE 10 MG/1
TABLET ORAL
Qty: 135 TABLET | Refills: 1 | Status: SHIPPED | OUTPATIENT
Start: 2019-01-23 | End: 2019-09-27 | Stop reason: SDUPTHER

## 2019-01-23 RX ORDER — MONTELUKAST SODIUM 10 MG/1
10 TABLET ORAL
Qty: 30 TABLET | Refills: 1 | Status: SHIPPED | OUTPATIENT
Start: 2019-01-23

## 2019-01-23 RX ORDER — PENICILLIN V POTASSIUM 500 MG/1
500 TABLET ORAL EVERY 8 HOURS SCHEDULED
Qty: 21 TABLET | Refills: 0 | Status: SHIPPED | OUTPATIENT
Start: 2019-01-23 | End: 2019-01-30 | Stop reason: ALTCHOICE

## 2019-01-23 RX ORDER — PREDNISONE 10 MG/1
TABLET ORAL
Qty: 21 TABLET | Refills: 0 | Status: SHIPPED | OUTPATIENT
Start: 2019-01-23 | End: 2019-01-30 | Stop reason: ALTCHOICE

## 2019-01-23 NOTE — PROGRESS NOTES
Assessment/Plan:     Diagnoses and all orders for this visit:    Acute non-recurrent pansinusitis  -     predniSONE 10 mg tablet; 6 tabs on Day 1,5 tabs on Day 2,4 tabs on Day 3,3 tabs on Day 4,2 tabs on  Day 5And 1 tab on Day 6  -     penicillin V potassium (VEETID) 500 mg tablet; Take 1 tablet (500 mg total) by mouth every 8 (eight) hours for 7 days    Allergic rhinitis, unspecified seasonality, unspecified trigger  -     montelukast (SINGULAIR) 10 mg tablet; Take 1 tablet (10 mg total) by mouth daily at bedtime    Depression, unspecified depression type  -     escitalopram (LEXAPRO) 10 mg tablet; Take 1 and 1/2 tablets daily      the I believe the patient has a significant sinus infection  She is allergic to multiple medications and does not want to take the doxycycline again because it did nothing for her  She also declined Augmentin which she has allergies to but states she the like these aspects on her will do Pen-VK well as a prednisone pack  We also discussed her allergy issues and will start Singulair  We need to refill her escitalopram for depression issues which is stable  She can follow up as needed      Subjective:     Chief Complaint   Patient presents with    Cough     Cough yellow green mucous, sinus pressure, and ear pressure for 5 days        Patient ID: Joellen Brice is a 79 y o  female      Patient is here for 5 days of sinus pain and pressure  She states that she is overall getting worse  She said she recently moved out of the house with lots of rabbits  She happens to be allergic to wrapped  She is requesting allergy medicine  She is refill of her Lexapro  Her other chronic conditions are stable  She states that most of her mucus is yellow  She has slight cough with this        The following portions of the patient's history were reviewed and updated as appropriate: allergies, current medications, past family history, past medical history, past social history, past surgical history and problem list     Review of Systems   Constitutional: Negative  HENT: Positive for congestion, postnasal drip, rhinorrhea, sinus pain and sinus pressure  Eyes: Negative  Respiratory: Positive for cough  Cardiovascular: Negative  Gastrointestinal: Negative  Endocrine: Negative  Genitourinary: Negative  Musculoskeletal: Negative  Skin: Negative  Allergic/Immunologic: Negative  Neurological: Negative  Hematological: Negative  Psychiatric/Behavioral: Negative  All other systems reviewed and are negative  Objective:    Vitals:    01/23/19 1427   BP: 130/70   BP Location: Right arm   Patient Position: Sitting   Cuff Size: Standard   Pulse: 84   Temp: 98 3 °F (36 8 °C)   TempSrc: Tympanic   Weight: 74 1 kg (163 lb 6 4 oz)   Height: 5' 3" (1 6 m)          Physical Exam   Constitutional: She is oriented to person, place, and time  She appears well-developed and well-nourished  HENT:   Head: Normocephalic and atraumatic  Right Ear: External ear normal    Left Ear: External ear normal    Mouth/Throat: Oropharynx is clear and moist    Pain and pressure to sinuses  Thick yellow mucus in back of pharynx   Eyes: Pupils are equal, round, and reactive to light  Conjunctivae and EOM are normal    Neck: Normal range of motion  Cardiovascular: Normal rate, regular rhythm and normal heart sounds  Pulmonary/Chest: Effort normal and breath sounds normal    Abdominal: Soft  Bowel sounds are normal    Musculoskeletal: Normal range of motion  Neurological: She is alert and oriented to person, place, and time  She has normal reflexes  Skin: Skin is warm and dry  Psychiatric: She has a normal mood and affect  Her behavior is normal  Judgment and thought content normal    Nursing note and vitals reviewed

## 2019-01-30 ENCOUNTER — OFFICE VISIT (OUTPATIENT)
Dept: FAMILY MEDICINE CLINIC | Facility: CLINIC | Age: 69
End: 2019-01-30
Payer: COMMERCIAL

## 2019-01-30 ENCOUNTER — APPOINTMENT (OUTPATIENT)
Dept: RADIOLOGY | Facility: CLINIC | Age: 69
End: 2019-01-30
Payer: COMMERCIAL

## 2019-01-30 VITALS
BODY MASS INDEX: 29.23 KG/M2 | RESPIRATION RATE: 18 BRPM | TEMPERATURE: 98.5 F | SYSTOLIC BLOOD PRESSURE: 132 MMHG | HEIGHT: 63 IN | WEIGHT: 165 LBS | DIASTOLIC BLOOD PRESSURE: 68 MMHG | HEART RATE: 84 BPM

## 2019-01-30 DIAGNOSIS — R05.9 COUGH: Primary | ICD-10-CM

## 2019-01-30 DIAGNOSIS — R05.9 COUGH: ICD-10-CM

## 2019-01-30 DIAGNOSIS — R06.2 WHEEZING: ICD-10-CM

## 2019-01-30 DIAGNOSIS — J40 BRONCHITIS: ICD-10-CM

## 2019-01-30 PROCEDURE — 3008F BODY MASS INDEX DOCD: CPT | Performed by: NURSE PRACTITIONER

## 2019-01-30 PROCEDURE — 99213 OFFICE O/P EST LOW 20 MIN: CPT | Performed by: NURSE PRACTITIONER

## 2019-01-30 PROCEDURE — 71046 X-RAY EXAM CHEST 2 VIEWS: CPT

## 2019-01-30 RX ORDER — DOXYCYCLINE 100 MG/1
100 TABLET ORAL 2 TIMES DAILY
Qty: 14 TABLET | Refills: 0 | Status: SHIPPED | OUTPATIENT
Start: 2019-01-30 | End: 2019-02-06

## 2019-01-30 RX ORDER — ALBUTEROL SULFATE 90 UG/1
2 AEROSOL, METERED RESPIRATORY (INHALATION) EVERY 6 HOURS PRN
Qty: 1 INHALER | Refills: 0 | Status: SHIPPED | OUTPATIENT
Start: 2019-01-30 | End: 2019-03-01

## 2019-01-30 NOTE — LETTER
January 30, 2019     Patient: Courtney Vences   YOB: 1950   Date of Visit: 1/30/2019       To Whom it May Concern:    Yamini Gallagher is under my professional care  She was seen in my office on 1/30/2019  She may return to work on 2/4/2019  If you have any questions or concerns, please don't hesitate to call           Sincerely,          DEBBIE Munoz        CC: No Recipients

## 2019-01-30 NOTE — PROGRESS NOTES
Assessment/Plan   Diagnoses and all orders for this visit:    Cough  -     XR chest pa & lateral; Future    Wheezing  -     budesonide (PULMICORT) 180 MCG/ACT inhaler; Inhale 1 puff 2 (two) times a day  -     albuterol (PROVENTIL HFA,VENTOLIN HFA) 90 mcg/act inhaler; Inhale 2 puffs every 6 (six) hours as needed for wheezing for up to 30 days    Bronchitis  -     doxycycline (ADOXA) 100 MG tablet; Take 1 tablet (100 mg total) by mouth 2 (two) times a day for 7 days        Chief Complaint   Patient presents with    Follow-up     visit of 01/23/19 -- feels worse and now has nausea       Subjective   Patient ID: Marisa Khan is a 79 y o  female  Vitals:    01/30/19 1347   BP: 132/68   Pulse: 84   Resp: 18   Temp: 98 5 °F (36 9 °C)     Cough   This is a new problem  Episode onset: 1/23/2019 here and was treated with prednisone and PCN for a sinus infection, felt a little better but then over the weekend coughing and chest tightness with an over whelming sense of fatigue, "spent three days in bed" The problem has been gradually worsening  The problem occurs constantly  The cough is non-productive  Associated symptoms include chills, ear congestion, a fever, headaches, myalgias, postnasal drip, weight loss and wheezing  Pertinent negatives include no ear pain, nasal congestion, sore throat or shortness of breath  Chest pain: chest tightness  The symptoms are aggravated by lying down  Risk factors for lung disease include smoking/tobacco exposure  She has tried OTC cough suppressant and a beta-agonist inhaler for the symptoms  The treatment provided no relief  Her past medical history is significant for bronchitis and environmental allergies         The following portions of the patient's history were reviewed and updated as appropriate: allergies, current medications, past medical history, past social history, past surgical history and problem list     Review of Systems   Constitutional: Positive for chills, fever and weight loss  HENT: Positive for postnasal drip and sinus pressure  Negative for ear pain and sore throat  Eyes: Negative  Respiratory: Positive for cough and wheezing  Negative for shortness of breath  Cardiovascular: Negative for palpitations and leg swelling  Chest pain: chest tightness  Gastrointestinal: Positive for nausea  Negative for blood in stool, diarrhea and vomiting  Endocrine: Negative  Genitourinary: Negative  Musculoskeletal: Positive for myalgias  Allergic/Immunologic: Positive for environmental allergies  Neurological: Positive for headaches  Hematological: Negative  Psychiatric/Behavioral: Negative  Objective     Physical Exam   Constitutional: She is oriented to person, place, and time  She appears well-developed and well-nourished  No distress  HENT:   Head: Normocephalic and atraumatic  Right Ear: Tympanic membrane is bulging  Tympanic membrane is not erythematous  A middle ear effusion is present  No decreased hearing is noted  Left Ear: Tympanic membrane is bulging  Tympanic membrane is not erythematous  A middle ear effusion is present  No decreased hearing is noted  Nose: Mucosal edema and sinus tenderness present  Right sinus exhibits no maxillary sinus tenderness and no frontal sinus tenderness  Left sinus exhibits no maxillary sinus tenderness and no frontal sinus tenderness  Mouth/Throat: Uvula is midline and mucous membranes are normal  Posterior oropharyngeal erythema present  No oropharyngeal exudate, posterior oropharyngeal edema or tonsillar abscesses  Tonsils are 1+ on the right  Tonsils are 1+ on the left  No tonsillar exudate  Eyes: Conjunctivae are normal  Right eye exhibits no discharge  Left eye exhibits no discharge  Neck: Normal range of motion  Neck supple  No thyromegaly present  Cardiovascular: Normal rate and regular rhythm  No murmur heard    Pulmonary/Chest: Effort normal and breath sounds normal  No respiratory distress  She has no wheezes (decreased breath sounds bilateral bases)  Abdominal: Soft  Bowel sounds are normal    Musculoskeletal: Normal range of motion  She exhibits no edema or tenderness  Lymphadenopathy:     She has no cervical adenopathy  Neurological: She is alert and oriented to person, place, and time  Skin: Skin is warm and dry  Capillary refill takes less than 2 seconds  No rash noted  She is not diaphoretic  No erythema  Psychiatric: She has a normal mood and affect  Her behavior is normal  Judgment and thought content normal    Nursing note and vitals reviewed      Allergies   Allergen Reactions    Cefuroxime Hives    Erythromycin Shortness Of Breath    Shellfish-Derived Products Anaphylaxis    Levofloxacin GI Intolerance and Abdominal Pain     Reaction Date: 26Feb2013;     Silver Sulfadiazine     Sulfamethoxazole-Trimethoprim Hives     Patient Active Problem List   Diagnosis    Allergic rhinitis    Chronic cervical pain    Chronic obstructive pulmonary disease (HCC)    Depression    Esophageal reflux    Generalized anxiety disorder    Hyperlipidemia    Nicotine dependence       Current Outpatient Prescriptions:     cetirizine (ZyrTEC) 10 mg tablet, Take 1 tablet (10 mg total) by mouth daily, Disp: 30 tablet, Rfl: 2    escitalopram (LEXAPRO) 10 mg tablet, Take 1 and 1/2 tablets daily, Disp: 135 tablet, Rfl: 1    montelukast (SINGULAIR) 10 mg tablet, Take 1 tablet (10 mg total) by mouth daily at bedtime, Disp: 30 tablet, Rfl: 1    omeprazole (PRILOSEC OTC) 20 MG tablet, Take 1 tablet by mouth daily as needed  , Disp: , Rfl:     albuterol (PROVENTIL HFA,VENTOLIN HFA) 90 mcg/act inhaler, Inhale 2 puffs every 6 (six) hours as needed for wheezing for up to 30 days, Disp: 1 Inhaler, Rfl: 0    budesonide (PULMICORT) 180 MCG/ACT inhaler, Inhale 1 puff 2 (two) times a day, Disp: 1 Inhaler, Rfl: 0    doxycycline (ADOXA) 100 MG tablet, Take 1 tablet (100 mg total) by mouth 2 (two) times a day for 7 days, Disp: 14 tablet, Rfl: 0  Social History     Social History    Marital status: /Civil Union     Spouse name: N/A    Number of children: N/A    Years of education: N/A     Occupational History    Not on file       Social History Main Topics    Smoking status: Current Every Day Smoker     Packs/day: 0 50    Smokeless tobacco: Never Used    Alcohol use No    Drug use: No    Sexual activity: Not on file     Other Topics Concern    Not on file     Social History Narrative    No narrative on file     Family History   Problem Relation Age of Onset    Stroke Mother         CVA    Diabetes type II Mother     No Known Problems Father

## 2019-01-30 NOTE — PATIENT INSTRUCTIONS
Acute Bronchitis   WHAT YOU NEED TO KNOW:   Acute bronchitis is swelling and irritation in the air passages of your lungs  This irritation may cause you to cough or have other breathing problems  Acute bronchitis often starts because of another illness, such as a cold or the flu  The illness spreads from your nose and throat to your windpipe and airways  Bronchitis is often called a chest cold  Acute bronchitis lasts about 3 to 6 weeks and is usually not a serious illness  Your cough can last for several weeks  DISCHARGE INSTRUCTIONS:   Return to the emergency department if:   · You cough up blood  · Your lips or fingernails turn blue  · You feel like you are not getting enough air when you breathe  Contact your healthcare provider if:   · You have a fever  · Your breathing problems do not go away or get worse  · Your cough does not get better within 4 weeks  · You have questions or concerns about your condition or care  Self-care:   · Get more rest   Rest helps your body to heal  Slowly start to do more each day  Rest when you feel it is needed  · Avoid irritants in the air  Avoid chemicals, fumes, and dust  Wear a face mask if you must work around dust or fumes  Stay inside on days when air pollution levels are high  If you have allergies, stay inside when pollen counts are high  Do not use aerosol products, such as spray-on deodorant, bug spray, and hair spray  · Do not smoke or be around others who smoke  Nicotine and other chemicals in cigarettes and cigars damages the cilia that move mucus out of your lungs  Ask your healthcare provider for information if you currently smoke and need help to quit  E-cigarettes or smokeless tobacco still contain nicotine  Talk to your healthcare provider before you use these products  · Drink liquids as directed  Liquids help keep your air passages moist and help you cough up mucus   You may need to drink more liquids when you have acute bronchitis  Ask how much liquid to drink each day and which liquids are best for you  · Use a humidifier or vaporizer  Use a cool mist humidifier or a vaporizer to increase air moisture in your home  This may make it easier for you to breathe and help decrease your cough  Decrease risk for acute bronchitis:   · Get the vaccinations you need  Ask your healthcare provider if you should get vaccinated against the flu or pneumonia  · Prevent the spread of germs  You can decrease your risk of acute bronchitis and other illnesses by doing the following:     [facility] your hands often with soap and water  Carry germ-killing hand lotion or gel with you  You can use the lotion or gel to clean your hands when soap and water are not available  ¨ Do not touch your eyes, nose, or mouth unless you have washed your hands first     ¨ Always cover your mouth when you cough to prevent the spread of germs  It is best to cough into a tissue or your shirt sleeve instead of into your hand  Ask those around you cover their mouths when they cough  ¨ Try to avoid people who have a cold or the flu  If you are sick, stay away from others as much as possible  Medicines: Your healthcare provider may  give you any of the following:  · Ibuprofen or acetaminophen  are medicines that help lower your fever  They are available without a doctor's order  Ask your healthcare provider which medicine is right for you  Ask how much to take and how often to take it  Follow directions  These medicines can cause stomach bleeding if not taken correctly  Ibuprofen can cause kidney damage  Do not take ibuprofen if you have kidney disease, an ulcer, or allergies to aspirin  Acetaminophen can cause liver damage  Do not take more than 4,000 milligrams in 24 hours  · Decongestants  help loosen mucus in your lungs and make it easier to cough up  This can help you breathe easier  · Cough suppressants  decrease your urge to cough   If your cough produces mucus, do not take a cough suppressant unless your healthcare provider tells you to  Your healthcare provider may suggest that you take a cough suppressant at night so you can rest     · Inhalers  may be given  Your healthcare provider may give you one or more inhalers to help you breathe easier and cough less  An inhaler gives your medicine to open your airways  Ask your healthcare provider to show you how to use your inhaler correctly  · Take your medicine as directed  Contact your healthcare provider if you think your medicine is not helping or if you have side effects  Tell him of her if you are allergic to any medicine  Keep a list of the medicines, vitamins, and herbs you take  Include the amounts, and when and why you take them  Bring the list or the pill bottles to follow-up visits  Carry your medicine list with you in case of an emergency  Follow up with your healthcare provider as directed:  Write down questions you have so you will remember to ask them during your follow-up visits  © 2017 [employer] Information is for End User's use only and may not be sold, redistributed or otherwise used for commercial purposes  All illustrations and images included in CareNotes® are the copyrighted property of [employer]  or [employer]  The above information is an  only  It is not intended as medical advice for individual conditions or treatments  Talk to your doctor, nurse or pharmacist before following any medical regimen to see if it is safe and effective for you

## 2019-01-30 NOTE — LETTER
January 30, 2019     Patient: Dea Banda   YOB: 1950   Date of Visit: 1/30/2019       To Whom it May Concern:    Jair Grigsby is under my professional care  She was seen in my office on 1/30/2019  She may return to work on 2/4/2019  If you have any questions or concerns, please don't hesitate to call           Sincerely,          DEBBIE Rodriguez        CC: No Recipients

## 2019-03-06 ENCOUNTER — OFFICE VISIT (OUTPATIENT)
Dept: URGENT CARE | Facility: CLINIC | Age: 69
End: 2019-03-06
Payer: COMMERCIAL

## 2019-03-06 ENCOUNTER — APPOINTMENT (OUTPATIENT)
Dept: RADIOLOGY | Facility: CLINIC | Age: 69
End: 2019-03-06
Payer: COMMERCIAL

## 2019-03-06 VITALS
RESPIRATION RATE: 16 BRPM | OXYGEN SATURATION: 97 % | BODY MASS INDEX: 28.88 KG/M2 | SYSTOLIC BLOOD PRESSURE: 130 MMHG | TEMPERATURE: 97.2 F | HEIGHT: 63 IN | WEIGHT: 163 LBS | HEART RATE: 86 BPM | DIASTOLIC BLOOD PRESSURE: 78 MMHG

## 2019-03-06 DIAGNOSIS — S20.212A CHEST WALL CONTUSION, LEFT, INITIAL ENCOUNTER: ICD-10-CM

## 2019-03-06 DIAGNOSIS — R07.89 LEFT-SIDED CHEST WALL PAIN: Primary | ICD-10-CM

## 2019-03-06 DIAGNOSIS — R07.89 LEFT-SIDED CHEST WALL PAIN: ICD-10-CM
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
## 2019-03-06 PROCEDURE — 71101 X-RAY EXAM UNILAT RIBS/CHEST: CPT

## 2019-03-06 PROCEDURE — 99213 OFFICE O/P EST LOW 20 MIN: CPT | Performed by: NURSE PRACTITIONER

## 2019-03-06 NOTE — PATIENT INSTRUCTIONS
Take motrin for pain   Take tylenol and use ice to the area  Rest  If breathing changes then go to the ER

## 2019-03-06 NOTE — PROGRESS NOTES
NAME: Radha Black is a 79 y o  female  : 1950    MRN: [id number]      Assessment and Plan   Left-sided chest wall pain [R07 89]  1  Left-sided chest wall pain  XR ribs left w pa chest min 3 views   2  Fall, initial encounter  XR ribs left w pa chest min 3 views   3  Chest wall contusion, left, initial encounter         Pa Gonzalez was seen today for chest pain  Diagnoses and all orders for this visit:    Left-sided chest wall pain  -     XR ribs left w pa chest min 3 views; Future    Fall, initial encounter  -     XR ribs left w pa chest min 3 views; Future    Chest wall contusion, left, initial encounter      Chest xray done today , no rib fx noted     Patient Instructions   Patient Instructions   Take motrin for pain   Take tylenol and use ice to the area  Rest  If breathing changes then go to the ER  Proceed to ER if symptoms worsen  Chief Complaint     Chief Complaint   Patient presents with    Chest Pain     Pt fell on MOnday night and landed on left side  Pt c/o pain 5/10 on left chest/breast area  Increased pain with deep inspiration  History of Present Illness     Patient is a 21-year-old female who was walking outside on Monday and slipped on the ice  As she fell she landed on her left side with her left arm tucked in under her chest when on her left elbow knee and side  Today she has discomfort to the left side of chest tender to palpate on exam   She has no left elbow or left knee discomfort today and has full range of motion noted  Denies hitting her head on impact  Review of Systems   Review of Systems   Respiratory: Negative for cough, chest tightness, shortness of breath and wheezing  Cardiovascular: Positive for chest pain (chest wall pain, left side after fall)           Current Medications       Current Outpatient Medications:     budesonide (PULMICORT) 180 MCG/ACT inhaler, Inhale 1 puff 2 (two) times a day, Disp: 1 Inhaler, Rfl: 0    escitalopram (LEXAPRO) 10 mg tablet, Take 1 and 1/2 tablets daily, Disp: 135 tablet, Rfl: 1    montelukast (SINGULAIR) 10 mg tablet, Take 1 tablet (10 mg total) by mouth daily at bedtime, Disp: 30 tablet, Rfl: 1    omeprazole (PRILOSEC OTC) 20 MG tablet, Take 1 tablet by mouth daily as needed  , Disp: , Rfl:     cetirizine (ZyrTEC) 10 mg tablet, Take 1 tablet (10 mg total) by mouth daily (Patient not taking: Reported on 3/6/2019), Disp: 30 tablet, Rfl: 2    Current Allergies     Allergies as of 2019 - Reviewed 2019   Allergen Reaction Noted    Cefuroxime Hives 2013    Erythromycin Shortness Of Breath     Shellfish-derived products Anaphylaxis 2013    Levofloxacin GI Intolerance and Abdominal Pain 2013    Silver sulfadiazine  2017    Sulfamethoxazole-trimethoprim Hives 10/29/2013              Past Medical History:   Diagnosis Date    [facility]     last assessed 3/12/15, resolved 10/26/16    Hyperlipidemia     Hypertension        Past Surgical History:   Procedure Laterality Date     SECTION      LAPAROSCOPY         Family History   Problem Relation Age of Onset    Stroke Mother         CVA    Diabetes type II Mother     No Known Problems Father          Medications have been verified  The following portions of the patient's history were reviewed and updated as appropriate: allergies, current medications, past family history, past medical history, past social history, past surgical history and problem list     Objective   /78   Pulse 86   Temp (!) 97 2 °F (36 2 °C)   Resp 16   Ht 5' 3" (1 6 m)   Wt 73 9 kg (163 lb)   SpO2 97%   BMI 28 87 kg/m²      Physical Exam     Physical Exam   Constitutional: She appears well-developed  Cardiovascular: Regular rhythm, normal heart sounds and normal pulses     Pulmonary/Chest: Effort normal and breath sounds normal    Slight discomfort when taking a deep breath to the left side of chest     Musculoskeletal:        Arms:      DEBBIE Pimentel

## 2019-05-02 ENCOUNTER — TELEPHONE (OUTPATIENT)
Dept: FAMILY MEDICINE CLINIC | Facility: CLINIC | Age: 69
End: 2019-05-02

## 2019-08-09 ENCOUNTER — OFFICE VISIT (OUTPATIENT)
Dept: FAMILY MEDICINE CLINIC | Facility: CLINIC | Age: 69
End: 2019-08-09
Payer: COMMERCIAL

## 2019-08-09 ENCOUNTER — HOSPITAL ENCOUNTER (OUTPATIENT)
Dept: MAMMOGRAPHY | Facility: CLINIC | Age: 69
Discharge: HOME | End: 2019-08-09
Payer: COMMERCIAL

## 2019-08-09 ENCOUNTER — APPOINTMENT (OUTPATIENT)
Dept: RADIOLOGY | Facility: CLINIC | Age: 69
End: 2019-08-09
Payer: COMMERCIAL

## 2019-08-09 VITALS
BODY MASS INDEX: 28.21 KG/M2 | HEIGHT: 63 IN | DIASTOLIC BLOOD PRESSURE: 76 MMHG | OXYGEN SATURATION: 98 % | TEMPERATURE: 96.9 F | HEART RATE: 84 BPM | WEIGHT: 159.2 LBS | SYSTOLIC BLOOD PRESSURE: 130 MMHG

## 2019-08-09 DIAGNOSIS — L29.9 PRURITUS: ICD-10-CM

## 2019-08-09 DIAGNOSIS — Z12.39 SCREENING FOR BREAST CANCER: Primary | ICD-10-CM

## 2019-08-09 DIAGNOSIS — R52 PAIN: ICD-10-CM

## 2019-08-09 DIAGNOSIS — Z12.39 SCREENING FOR BREAST CANCER: ICD-10-CM

## 2019-08-09 PROCEDURE — 73030 X-RAY EXAM OF SHOULDER: CPT

## 2019-08-09 PROCEDURE — 77063 BREAST TOMOSYNTHESIS BI: CPT

## 2019-08-09 PROCEDURE — 1160F RVW MEDS BY RX/DR IN RCRD: CPT | Performed by: NURSE PRACTITIONER

## 2019-08-09 PROCEDURE — 73010 X-RAY EXAM OF SHOULDER BLADE: CPT

## 2019-08-09 PROCEDURE — 99214 OFFICE O/P EST MOD 30 MIN: CPT | Performed by: NURSE PRACTITIONER

## 2019-08-09 PROCEDURE — 1101F PT FALLS ASSESS-DOCD LE1/YR: CPT | Performed by: NURSE PRACTITIONER

## 2019-08-09 PROCEDURE — 3008F BODY MASS INDEX DOCD: CPT | Performed by: NURSE PRACTITIONER

## 2019-08-09 PROCEDURE — 77067 SCR MAMMO BI INCL CAD: CPT

## 2019-08-09 RX ORDER — HYDROXYZINE PAMOATE 25 MG/1
25 CAPSULE ORAL 3 TIMES DAILY PRN
Qty: 30 CAPSULE | Refills: 0 | Status: SHIPPED | OUTPATIENT
Start: 2019-08-09

## 2019-08-09 RX ORDER — PREDNISONE 10 MG/1
10 TABLET ORAL DAILY
Qty: 20 TABLET | Refills: 0 | Status: SHIPPED | OUTPATIENT
Start: 2019-08-09

## 2019-08-09 NOTE — PROGRESS NOTES
Assessment/Plan   Diagnoses and all orders for this visit:    Screening for breast cancer  -     Mammo screening bilateral w 3d & cad; Future    Pruritus  -     hydrOXYzine pamoate (VISTARIL) 25 mg capsule; Take 1 capsule (25 mg total) by mouth 3 (three) times a day as needed for itching    Pain  -     predniSONE 10 mg tablet; Take 1 tablet (10 mg total) by mouth daily 5 tabs 8/9 & 8/10, 4 tabs 8/11, 3 tabs 8/12, 2 tabs 8/13, 1 tab 8/14  -     XR scapula right; Future  -     XR shoulder 2+ vw right; Future        Chief Complaint   Patient presents with    Shoulder Pain     right shoulder "on fire" burning, itching for two months  no rash  Subjective   Patient ID: Jaylin Sierra is a 79 y o  female  Vitals:    08/09/19 0825   BP: 130/76   Pulse: 84   Temp: (!) 96 9 °F (36 1 °C)   SpO2: 98%     Shoulder Pain    The pain is present in the right shoulder (right scapula)  This is a chronic (patient reports that she has had itching and burning over her right scapula for past two months  There is no rash or dry skin, there has been no trauma) problem  The current episode started more than 1 month ago  There has been no history of extremity trauma  The problem occurs constantly  The problem has been unchanged  The quality of the pain is described as burning and aching  The pain is at a severity of 5/10  The pain is moderate  Associated symptoms include itching and tingling ("occasional tingling in right hand" but not consitently )  Pertinent negatives include no fever, inability to bear weight, joint locking, joint swelling, limited range of motion or numbness  Exacerbated by: nothing  She has tried NSAIDS and OTC ointments (Patient did go to see a chirapractor and had massage and manipulation which helped for 1 hour then symptoms returned  Also taught exercise that helped to relieve some of the burning) for the symptoms  The treatment provided no relief   Family history does not include gout or rheumatoid arthritis  There is no history of diabetes, gout, osteoarthritis or rheumatoid arthritis  The following portions of the patient's history were reviewed and updated as appropriate: allergies, current medications, past family history, past social history, past surgical history and problem list     Review of Systems   Constitutional: Negative  Negative for fever  HENT: Negative  Eyes: Negative  Respiratory: Negative  Cardiovascular: Negative  Gastrointestinal: Negative  Endocrine: Negative  Genitourinary: Negative  Musculoskeletal: Positive for arthralgias (right scapula) and back pain  Negative for gout  Skin: Positive for itching  Negative for color change, rash and wound  Allergic/Immunologic: Negative  Neurological: Positive for tingling ("occasional tingling in right hand" but not consitently )  Negative for numbness  Hematological: Negative  Psychiatric/Behavioral: Negative  Objective     Physical Exam   Constitutional: She is oriented to person, place, and time  She appears well-developed and well-nourished  No distress  HENT:   Head: Normocephalic and atraumatic  Eyes: Pupils are equal, round, and reactive to light  Conjunctivae and EOM are normal  Right eye exhibits no discharge  Left eye exhibits no discharge  Neck: Normal range of motion  Neck supple  Cardiovascular: Normal rate, regular rhythm and normal heart sounds  Pulmonary/Chest: Effort normal and breath sounds normal    Abdominal: Soft  Musculoskeletal: Normal range of motion  She exhibits tenderness  She exhibits no edema  Right shoulder: She exhibits crepitus  She exhibits normal range of motion, no tenderness, no bony tenderness, no swelling, no effusion, no deformity, no laceration, no pain, normal pulse and normal strength  Thoracic back: She exhibits tenderness  She exhibits normal range of motion, no bony tenderness, no swelling and no edema  Back:    Neurological: She is alert and oriented to person, place, and time  Skin: Skin is warm and dry  Capillary refill takes less than 2 seconds  She is not diaphoretic  Psychiatric: She has a normal mood and affect  Her behavior is normal  Judgment and thought content normal    Nursing note and vitals reviewed      Allergies   Allergen Reactions    Cefuroxime Hives    Erythromycin Shortness Of Breath    Shellfish-Derived Products Anaphylaxis    Levofloxacin GI Intolerance and Abdominal Pain     Reaction Date: 26Feb2013;     Silver Sulfadiazine     Sulfamethoxazole-Trimethoprim Hives     Patient Active Problem List   Diagnosis    Allergic rhinitis    Chronic cervical pain    Chronic obstructive pulmonary disease (HCC)    Depression    Esophageal reflux    Generalized anxiety disorder    Hyperlipidemia    Nicotine dependence       Current Outpatient Medications:     budesonide (PULMICORT) 180 MCG/ACT inhaler, Inhale 1 puff 2 (two) times a day, Disp: 1 Inhaler, Rfl: 0    cetirizine (ZyrTEC) 10 mg tablet, Take 1 tablet (10 mg total) by mouth daily, Disp: 30 tablet, Rfl: 2    escitalopram (LEXAPRO) 10 mg tablet, Take 1 and 1/2 tablets daily, Disp: 135 tablet, Rfl: 1    montelukast (SINGULAIR) 10 mg tablet, Take 1 tablet (10 mg total) by mouth daily at bedtime, Disp: 30 tablet, Rfl: 1    omeprazole (PRILOSEC OTC) 20 MG tablet, Take 1 tablet by mouth daily as needed  , Disp: , Rfl:     hydrOXYzine pamoate (VISTARIL) 25 mg capsule, Take 1 capsule (25 mg total) by mouth 3 (three) times a day as needed for itching, Disp: 30 capsule, Rfl: 0    predniSONE 10 mg tablet, Take 1 tablet (10 mg total) by mouth daily 5 tabs 8/9 & 8/10, 4 tabs 8/11, 3 tabs 8/12, 2 tabs 8/13, 1 tab 8/14, Disp: 20 tablet, Rfl: 0  Social History     Socioeconomic History    Marital status: /Civil Union     Spouse name: Not on file    Number of children: Not on file    Years of education: Not on file    Highest education level: Not on file   Occupational History    Not on file   Social Needs    Financial resource strain: Not on file    Food insecurity:     Worry: Not on file     Inability: Not on file    Transportation needs:     Medical: Not on file     Non-medical: Not on file   Tobacco Use    Smoking status: Current Every Day Smoker     Packs/day: 0 50    Smokeless tobacco: Never Used   Substance and Sexual Activity    Alcohol use: No    Drug use: No    Sexual activity: Not on file   Lifestyle    Physical activity:     Days per week: Not on file     Minutes per session: Not on file    Stress: Not on file   Relationships    Social connections:     Talks on phone: Not on file     Gets together: Not on file     Attends Rastafarian service: Not on file     Active member of club or organization: Not on file     Attends meetings of clubs or organizations: Not on file     Relationship status: Not on file    Intimate partner violence:     Fear of current or ex partner: Not on file     Emotionally abused: Not on file     Physically abused: Not on file     Forced sexual activity: Not on file   Other Topics Concern    Not on file   Social History Narrative    Not on file     Family History   Problem Relation Age of Onset    Stroke Mother         CVA    Diabetes type II Mother     No Known Problems Father     No Known Problems Sister     No Known Problems Daughter     No Known Problems Maternal Grandmother     No Known Problems Maternal Grandfather     No Known Problems Paternal Grandmother     No Known Problems Paternal Grandfather

## 2019-08-09 NOTE — PATIENT INSTRUCTIONS
1  Try the vistaril for itching will make you drowsy no driving   2  Try the prednisone always with food, if burning pain stops then there is inflammation and your chiropractor may be able to order an MRI  3  Suggest physical therapy or continue with your chiropractor  4  Hydrocortisone cream - over the counter       5   [address] [place] [address]  [id number], [phone]

## 2019-08-13 ENCOUNTER — TELEPHONE (OUTPATIENT)
Dept: FAMILY MEDICINE CLINIC | Facility: CLINIC | Age: 69
End: 2019-08-13

## 2019-09-27 DIAGNOSIS — F32.A DEPRESSION, UNSPECIFIED DEPRESSION TYPE: ICD-10-CM

## 2019-09-27 RX ORDER — ESCITALOPRAM OXALATE 10 MG/1
TABLET ORAL
Qty: 135 TABLET | Refills: 1 | Status: SHIPPED | OUTPATIENT
Start: 2019-09-27

## 2019-10-01 DIAGNOSIS — F32.A DEPRESSION, UNSPECIFIED DEPRESSION TYPE: ICD-10-CM

## 2019-10-01 RX ORDER — ESCITALOPRAM OXALATE 10 MG/1
TABLET ORAL
Qty: 135 TABLET | Refills: 0 | OUTPATIENT
Start: 2019-10-01

## 2019-10-11 DIAGNOSIS — J30.1 SEASONAL ALLERGIC RHINITIS DUE TO POLLEN: ICD-10-CM

## 2019-10-11 DIAGNOSIS — L29.9 PRURITUS: ICD-10-CM

## 2019-10-11 RX ORDER — HYDROXYZINE PAMOATE 25 MG/1
CAPSULE ORAL
Qty: 30 CAPSULE | Refills: 0 | OUTPATIENT
Start: 2019-10-11

## 2019-10-11 RX ORDER — CETIRIZINE HYDROCHLORIDE 10 MG/1
TABLET ORAL
Qty: 30 TABLET | Refills: 0 | OUTPATIENT
Start: 2019-10-11

## 2019-10-14 DIAGNOSIS — J30.1 SEASONAL ALLERGIC RHINITIS DUE TO POLLEN: ICD-10-CM

## 2019-10-14 RX ORDER — CETIRIZINE HYDROCHLORIDE 10 MG/1
TABLET ORAL
Qty: 30 TABLET | Refills: 0 | OUTPATIENT
Start: 2019-10-14

## 2020-02-10 DIAGNOSIS — J30.1 SEASONAL ALLERGIC RHINITIS DUE TO POLLEN: ICD-10-CM

## 2020-02-10 RX ORDER — CETIRIZINE HYDROCHLORIDE 10 MG/1
TABLET ORAL
Qty: 30 TABLET | Refills: 0 | OUTPATIENT
Start: 2020-02-10

## 2020-02-29 DIAGNOSIS — F32.A DEPRESSION, UNSPECIFIED DEPRESSION TYPE: ICD-10-CM

## 2020-02-29 RX ORDER — ESCITALOPRAM OXALATE 10 MG/1
TABLET ORAL
Qty: 135 TABLET | Refills: 1 | OUTPATIENT
Start: 2020-02-29

## 2020-05-02 DIAGNOSIS — F32.A DEPRESSION, UNSPECIFIED DEPRESSION TYPE: ICD-10-CM

## 2020-05-02 RX ORDER — ESCITALOPRAM OXALATE 10 MG/1
TABLET ORAL
Qty: 135 TABLET | Refills: 1 | OUTPATIENT
Start: 2020-05-02

## 2020-05-05 DIAGNOSIS — F32.A DEPRESSION, UNSPECIFIED DEPRESSION TYPE: ICD-10-CM

## 2020-05-05 RX ORDER — ESCITALOPRAM OXALATE 10 MG/1
TABLET ORAL
Qty: 135 TABLET | Refills: 1 | OUTPATIENT
Start: 2020-05-05

## 2020-08-03 DIAGNOSIS — F32.A DEPRESSION, UNSPECIFIED DEPRESSION TYPE: ICD-10-CM

## 2020-08-03 RX ORDER — ESCITALOPRAM OXALATE 10 MG/1
TABLET ORAL
Qty: 135 TABLET | Refills: 1 | OUTPATIENT
Start: 2020-08-03

## 2020-11-11 ENCOUNTER — TELEPHONE (OUTPATIENT)
Dept: FAMILY MEDICINE CLINIC | Facility: CLINIC | Age: 70
End: 2020-11-11

## 2022-12-30 NOTE — MISCELLANEOUS
Message   Recorded as Task   Date: 10/28/2016 01:09 PM, Created By: Zara Elise   Task Name: Follow Up   Assigned To: Thelma Watson   Regarding Patient: Shannon Alcala, Status: In Progress   Comment:    Latosha Tony - 28 Oct 2016 1:09 PM     TASK CREATED  Please let the patient know that there were no findings on her labs to explain her symptoms  She has high Cholesterol- and we will discuss that more after we get the rest of the testing back  The neck x-ray demonstrates mild arthritis in the neck  She should go for the other testing as ordered  The other lab work looked normal    Thelma Watson - 28 Oct 2016 4:13 PM     TASK EDITED    10/28/2016  Left message on machine will call back Monday morning  trb   Thelma Watson - 28 Oct 2016 4:13 PM     TASK IN PROGRESS   10/31/2016 Patient notified  trb      Active Problems    1  Allergic rhinitis (477 9) (J30 9)   2  Cervical myofascial strain, initial encounter (847 0) (S16 1XXA)   3  Chronic cervical pain (723 1,338 29) (M54 2,G89 29)   4  Chronic nausea (787 02) (R11 0)   5  Chronic obstructive pulmonary disease (496) (J44 9)   6  Depression (311) (F32 9)   7  Esophageal reflux (530 81) (K21 9)   8  Exposure To Molds (V87 31)   9  Left facial numbness (782 0) (R20 0)   10  Lightheadedness (780 4) (R42)   11  Near syncope (780 2) (R55)   12  Needs flu shot (V04 81) (Z23)   13  Nicotine dependence (305 1) (F17 200)   14  Screening for breast cancer (V76 10) (Z12 39)    Current Meds   1  Diclofenac Sodium 1 % Transdermal Gel; 2 GM OF GEL TO AFFECTED AREA 4 TIMES   DAILY as needed  DO NOT APPLY MORE THAN 8 GM DAILY TO ANY ONE AFFECTED   JOINT; Therapy: [id number] to (Last Alma Archer)  Requested for: 26Oct2016 Ordered   2  PriLOSEC OTC 20 MG Oral Tablet Delayed Release; Take 1 tablet daily Recorded    Allergies    1  Ceftin TABS   2  Shellfish-derived Products   3  Bactrim TABS   4  Erythromycin Derivatives   5  LevoFLOXacin TABS   6   Silver Nitrate [address] [facility]    Signatures   Electronically signed by : Manjit Mcintosh DO; Oct 31 2016 11:20AM EST                       (Author) 1.82